# Patient Record
Sex: FEMALE | URBAN - METROPOLITAN AREA
[De-identification: names, ages, dates, MRNs, and addresses within clinical notes are randomized per-mention and may not be internally consistent; named-entity substitution may affect disease eponyms.]

---

## 2023-03-16 ENCOUNTER — TELEPHONE (OUTPATIENT)
Dept: INTERNAL MEDICINE CLINIC | Facility: CLINIC | Age: 88
End: 2023-03-16

## 2023-03-16 NOTE — TELEPHONE ENCOUNTER
dina from [facility] said she has very bad arthritis and needs a prn tylenol 650mg or a standing order for tylenol arthritis, she is faxing med list ,etc

## 2023-07-12 ENCOUNTER — TELEPHONE (OUTPATIENT)
Dept: OTHER | Facility: OTHER | Age: 88
End: 2023-07-12

## 2023-07-12 NOTE — TELEPHONE ENCOUNTER
Facility called that they had a problem with their fax machine and orders that were sent over did not get received correctly. Requesting for them to be resent.